# Patient Record
Sex: FEMALE | Race: ASIAN | NOT HISPANIC OR LATINO | Employment: FULL TIME | URBAN - METROPOLITAN AREA
[De-identification: names, ages, dates, MRNs, and addresses within clinical notes are randomized per-mention and may not be internally consistent; named-entity substitution may affect disease eponyms.]

---

## 2017-01-12 ENCOUNTER — OFFICE VISIT (OUTPATIENT)
Dept: OBSTETRICS AND GYNECOLOGY | Facility: CLINIC | Age: 42
End: 2017-01-12
Attending: OBSTETRICS & GYNECOLOGY
Payer: COMMERCIAL

## 2017-01-12 VITALS
SYSTOLIC BLOOD PRESSURE: 102 MMHG | DIASTOLIC BLOOD PRESSURE: 74 MMHG | WEIGHT: 116.38 LBS | HEIGHT: 63 IN | BODY MASS INDEX: 20.62 KG/M2

## 2017-01-12 DIAGNOSIS — Z00.00 PERIODIC HEALTH ASSESSMENT, GENERAL SCREENING, ADULT: ICD-10-CM

## 2017-01-12 DIAGNOSIS — Z01.419 WELL WOMAN EXAM: Primary | ICD-10-CM

## 2017-01-12 DIAGNOSIS — Z11.51 ENCOUNTER FOR SCREENING FOR HUMAN PAPILLOMAVIRUS (HPV): ICD-10-CM

## 2017-01-12 DIAGNOSIS — Z31.69 ENCOUNTER FOR PRECONCEPTION CONSULTATION: ICD-10-CM

## 2017-01-12 DIAGNOSIS — Z01.419 ENCOUNTER FOR GYNECOLOGICAL EXAMINATION: ICD-10-CM

## 2017-01-12 DIAGNOSIS — Z12.4 PAP SMEAR FOR CERVICAL CANCER SCREENING: ICD-10-CM

## 2017-01-12 PROCEDURE — 99396 PREV VISIT EST AGE 40-64: CPT | Mod: S$GLB,,, | Performed by: OBSTETRICS & GYNECOLOGY

## 2017-01-12 PROCEDURE — 87624 HPV HI-RISK TYP POOLED RSLT: CPT

## 2017-01-12 PROCEDURE — 99999 PR PBB SHADOW E&M-EST. PATIENT-LVL II: CPT | Mod: PBBFAC,,, | Performed by: OBSTETRICS & GYNECOLOGY

## 2017-01-12 PROCEDURE — 88175 CYTOPATH C/V AUTO FLUID REDO: CPT

## 2017-01-12 RX ORDER — TENOFOVIR DISOPROXIL FUMARATE 300 MG/1
300 TABLET, COATED ORAL DAILY
Refills: 3 | COMMUNITY
Start: 2016-12-15

## 2017-01-12 RX ORDER — ALBUTEROL SULFATE 90 UG/1
AEROSOL, METERED RESPIRATORY (INHALATION)
Refills: 0 | COMMUNITY
Start: 2016-11-29

## 2017-01-12 RX ORDER — EMOLLIENT COMBINATION NO.32
1 EMULSION, EXTENDED RELEASE TOPICAL 2 TIMES DAILY
Refills: 1 | COMMUNITY
Start: 2016-10-13

## 2017-01-12 RX ORDER — CETIRIZINE HYDROCHLORIDE 5 MG/1
5 TABLET, CHEWABLE ORAL DAILY
COMMUNITY

## 2017-01-12 NOTE — MR AVS SNAPSHOT
[facility]  [address]  [address]  [facility] [address]  Phone: [phone]  Fax: [phone]                  Virginie Maurer   2017 8:45 AM   Office Visit    Description:  Female : 1975   Provider:  Miryam Chavira MD   [facility]           Reason for Visit     Annual Exam           Diagnoses this Visit        Comments    Well woman exam    -  Primary     Pap smear for cervical cancer screening         Encounter for screening for human papillomavirus (HPV)         Encounter for gynecological examination         Encounter for preconception consultation         Periodic health assessment, general screening, adult                To Do List           Goals (5 Years of Data)     None      Follow-Up and Disposition     Return in about 1 year (around 2018).    Follow-up and Disposition History      [facility]     [facility] -  Assistance  Registered nurses in the [facility] provide clinical advisement, health education, appointment booking, and other advisory services.  Call for this free service at [phone].             Medications                Verify that the below list of medications is an accurate representation of the medications you are currently taking.  If none reported, the list may be blank. If incorrect, please contact your healthcare provider. Carry this list with you in case of emergency.           Current Medications     cetirizine (ZYRTEC) 5 MG chewable tablet Take 5 mg by mouth once daily.    EPICERAM Geovany Apply 1 application topically 2 (two) times daily. Apply to affected area    VENTOLIN HFA 90 mcg/actuation inhaler INHALE 1 TO 2 PUFF(S) INTO THE LUNGS EVERY 6 HOURS AS NEEDED FOR SHORTNESS OF BREATH    VIREAD 300 mg Tab Take 300 mg by mouth once daily.           Clinical Reference Information           Vital Signs - Last Recorded  Most recent update: 2017  9:17 AM by Kirit  "PARRIS Devine MA    BP Ht Wt LMP BMI    102/74 5' 3" (1.6 m) 52.8 kg (116 lb 6.5 oz) 01/10/2017 (Exact Date) 20.62 kg/m2      Blood Pressure          Most Recent Value    BP  102/74      Allergies as of 1/12/2017     Sulfa (Sulfonamide Antibiotics)      Immunizations Administered on Date of Encounter - 1/12/2017     None      Orders Placed During Today's Visit      Normal Orders This Visit    CBC auto differential     Comprehensive metabolic panel     Hemoglobin A1c     HPV DNA probe, amplified     Lipid panel     Liquid-based pap smear, screening     Rubella antibody, IgG     TSH     Future Labs/Procedures Expected by Expires    CBC auto differential  1/12/2017 3/13/2018    Comprehensive metabolic panel  1/12/2017 1/12/2018    Rubella antibody, IgG  1/12/2017 3/13/2018    TSH  1/12/2017 3/13/2018    Hemoglobin A1c  As directed 3/13/2018    Lipid panel  As directed 3/13/2018      [facility] Sign-Up     Activating your [facility] account is as easy as 1-2-3!     1) Visit [email or web address], select Sign Up Now, enter this activation code and your date of birth, then select Next.  [email or web address]  Expires: 2/26/2017  9:03 AM      2) Create a username and password to use when you visit [facility] in the future and select a security question in case you lose your password and select Next.    3) Enter your e-mail address and click Sign Up!    Additional Information  If you have questions, please e-mail [email or web address] or call [phone] to talk to our [facility] staff. Remember, [facility] is NOT to be used for urgent needs. For medical emergencies, dial 911.         "

## 2017-01-12 NOTE — PROGRESS NOTES
Subjective:       Patient ID: Virginie Maurer is a 41 y.o. female.    Chief Complaint:  Annual Exam (last annual 2014, last pap10/2013 nor, mammo and u/s 2016 per pt normal)      History of Present Illness.  Virginie Maurer is a 41 y.o. female.  She has no breast or urinary symptoms.  She has no menorrhagia, oligomenorrhea, bleeding betweeen menses, postcoital bleeding, dysmenorrhea, pelvic pain, dyspareunia, vaginal dryness, vaginal discharge, or sexual complaints.  She stopped OCP 2 years ago and has been using condoms.  She recently  and wants to conceive.  She has a history of Hepatitis B and sees Dr. Mcgraw.  Her last visit with him was 6 months ago and she was told she is not active but has not built up immunity yet.  She takes Viread which is category B.    GYN & OB History  Patient's last menstrual period was 01/10/2017 (exact date).   Date of Last Pap: 10/28/13 Normal HPV negative  Date of last mammogram: 2016    OB History    Para Term  AB SAB TAB Ectopic Multiple Living   0 0 0 0 0 0 0 0 0 0             Past Medical History   Diagnosis Date    Asthma     Hep B w/o coma      History reviewed. No pertinent past surgical history.  Family History   Problem Relation Age of Onset    Liver cancer Maternal Grandmother     No Known Problems Father     No Known Problems Mother     Breast cancer Neg Hx     Colon cancer Neg Hx     Ovarian cancer Neg Hx     Diabetes Neg Hx     Hypertension Neg Hx      Social History   Substance Use Topics    Smoking status: Never Smoker    Smokeless tobacco: None    Alcohol use Yes      Comment: occasional       Current Outpatient Prescriptions:     cetirizine (ZYRTEC) 5 MG chewable tablet, Take 5 mg by mouth once daily., Disp: , Rfl:     EPICERAM Geovany, Apply 1 application topically 2 (two) times daily. Apply to affected area, Disp: , Rfl: 1    VENTOLIN HFA 90 mcg/actuation inhaler, INHALE 1 TO 2 PUFF(S) INTO THE LUNGS EVERY 6 HOURS AS  "NEEDED FOR SHORTNESS OF BREATH, Disp: , Rfl: 0    VIREAD 300 mg Tab, Take 300 mg by mouth once daily., Disp: , Rfl: 3    Review of patient's allergies indicates:   Allergen Reactions    Sulfa (sulfonamide antibiotics)      Other reaction(s): Skin Rashes/Hives       Review of Systems  Review of Systems   Constitutional: Negative for fatigue.   HENT: Negative for trouble swallowing.    Eyes: Negative for visual disturbance.   Respiratory: Negative for cough and shortness of breath.    Cardiovascular: Negative for chest pain.   Gastrointestinal: Negative for abdominal distention, abdominal pain, blood in stool, nausea and vomiting.   Genitourinary: Negative for difficulty urinating, dyspareunia, dysuria, flank pain, frequency, hematuria, pelvic pain, urgency, vaginal bleeding, vaginal discharge and vaginal pain.   Musculoskeletal: Negative for arthralgias.   Skin: Negative for rash.   Neurological: Negative for dizziness and headaches.   Psychiatric/Behavioral: Negative for sleep disturbance. The patient is not nervous/anxious.         Objective:     Vitals:    01/12/17 0915   BP: 102/74   Weight: 52.8 kg (116 lb 6.5 oz)   Height: 5' 3" (1.6 m)   PainSc: 0-No pain     Body mass index is 20.62 kg/(m^2).    Physical Exam:   Constitutional: She is oriented to person, place, and time. Vital signs are normal. She appears well-developed and well-nourished.    HENT:   Head: Normocephalic.     Neck: Normal range of motion. No thyromegaly present.     Pulmonary/Chest: Right breast exhibits no mass, no nipple discharge, no skin change, no tenderness and no swelling. Left breast exhibits no mass, no nipple discharge, no skin change, no tenderness and no swelling. Breasts are symmetrical.        Abdominal: Soft. Normal appearance and bowel sounds are normal. She exhibits no distension. There is no tenderness.     Genitourinary: Rectum normal, vagina normal and uterus normal. Rectal exam shows guaiac negative stool. Guaiac " negative stool. Pelvic exam was performed with patient supine. There is no rash, tenderness, lesion or injury on the right labia. There is no rash, tenderness, lesion or injury on the left labia. Cervix is normal. Right adnexum displays no mass, no tenderness and no fullness. Left adnexum displays no mass, no tenderness and no fullness. No erythema in the vagina. No vaginal discharge found. Cervix exhibits no motion tenderness and no discharge.           Musculoskeletal: Normal range of motion.      Lymphadenopathy:        Right: No inguinal and no supraclavicular adenopathy present.        Left: No inguinal and no supraclavicular adenopathy present.    Neurological: She is alert and oriented to person, place, and time.    Skin: Skin is warm and dry.    Psychiatric: She has a normal mood and affect.        Assessment/ Plan:   Well woman exam    Pap smear for cervical cancer screening  -     Cancel: Liquid-based pap smear, screening    Encounter for screening for human papillomavirus (HPV)  -     Cancel: HPV DNA probe, amplified    Encounter for gynecological examination    Encounter for preconception consultation  -     Cancel: CBC auto differential; Future; Expected date: 1/12/17  -     Cancel: Comprehensive metabolic panel; Future; Expected date: 1/12/17  -     Cancel: TSH; Future; Expected date: 1/12/17  -     Cancel: Lipid panel; Future  -     Cancel: Hemoglobin A1c; Future  -     Cancel: Rubella antibody, IgG; Future; Expected date: 1/12/17  -     CBC auto differential; Future; Expected date: 1/12/17  -     Comprehensive metabolic panel; Future; Expected date: 1/12/17  -     TSH; Future; Expected date: 1/12/17  -     Lipid panel; Future  -     Hemoglobin A1c; Future  -     Rubella antibody, IgG; Future; Expected date: 1/12/17    Periodic health assessment, general screening, adult  -     Cancel: CBC auto differential; Future; Expected date: 1/12/17  -     Cancel: Comprehensive metabolic panel; Future; Expected  date: 1/12/17  -     Cancel: TSH; Future; Expected date: 1/12/17  -     Cancel: Lipid panel; Future  -     Cancel: Hemoglobin A1c; Future  -     CBC auto differential; Future; Expected date: 1/12/17  -     Comprehensive metabolic panel; Future; Expected date: 1/12/17  -     TSH; Future; Expected date: 1/12/17  -     Lipid panel; Future  -     Hemoglobin A1c; Future      Recommend MFM consult preconceptionally due to Hepatitis B.  Recommend appointment with Dr. Lucien CHINCHILLA.  Recommend seeing JEOVANY due to age.  Routine pap smears.  Self breast exam and routine mammography discussed.  Diet and exercise discussed.  Contraception reviewed/discussed.    Follow-up with me in 1 year

## 2017-01-20 LAB — HUMAN PAPILLOMAVIRUS (HPV): NOT DETECTED

## 2017-01-23 ENCOUNTER — TELEPHONE (OUTPATIENT)
Dept: OBSTETRICS AND GYNECOLOGY | Facility: CLINIC | Age: 42
End: 2017-01-23

## 2017-01-23 NOTE — TELEPHONE ENCOUNTER
Informed pt of result. Pt verbalized understanding. Pt is asking for the results of her lab work. Informed pt that we have not received the results yet and as soon as we get them we will call to inform her.

## 2017-01-23 NOTE — TELEPHONE ENCOUNTER
----- Message from Miryam Chavira MD sent at 1/23/2017 12:11 PM CST -----  Call patient and tell her that her Pap smear is normal and I will see her in 1 year for her annual exam.

## 2017-02-08 ENCOUNTER — TELEPHONE (OUTPATIENT)
Dept: OBSTETRICS AND GYNECOLOGY | Facility: CLINIC | Age: 42
End: 2017-02-08

## 2017-02-08 NOTE — TELEPHONE ENCOUNTER
Dr. Chavira's pt calling, pt would like to know if have received the results of her labs. Please call pt at [phone].

## 2017-02-08 NOTE — TELEPHONE ENCOUNTER
Informed pt that I have not received the results yet and that Dr. Chavira may have them but she would not be back in office until Monday. Pt verbalized understanding and asked that either Dr. Chavira or I call her on Monday to let her know if we have the results, and if we have not received them she will fill out a medical release form to have them resent to us.

## 2017-02-13 NOTE — TELEPHONE ENCOUNTER
Left voicemail to inform pt that we have not received results as of yet and to call the facility she had them done at to have results faxed to the "[facility] office. Fax number was provided. Informed pt once we receive them we will call her with the results.

## 2017-02-15 NOTE — TELEPHONE ENCOUNTER
Informed pt that results were received and wnl per Dr. Chavira. Gave pt information in regards to [facility]. Pt verbalized understanding.